# Patient Record
Sex: FEMALE | Race: BLACK OR AFRICAN AMERICAN | NOT HISPANIC OR LATINO | Employment: OTHER | URBAN - METROPOLITAN AREA
[De-identification: names, ages, dates, MRNs, and addresses within clinical notes are randomized per-mention and may not be internally consistent; named-entity substitution may affect disease eponyms.]

---

## 2023-09-25 PROBLEM — Z20.822 CLOSE EXPOSURE TO SEVERE ACUTE RESPIRATORY SYNDROME CORONAVIRUS 2 (SARS-COV-2): Status: ACTIVE | Noted: 2022-01-24

## 2023-09-25 PROBLEM — L29.9 PRURITUS: Status: ACTIVE | Noted: 2023-09-25

## 2023-09-25 PROBLEM — G95.20 CORD COMPRESSION (MULTI): Status: ACTIVE | Noted: 2023-09-25

## 2023-09-25 PROBLEM — R00.0 TACHYCARDIA: Status: ACTIVE | Noted: 2023-09-25

## 2023-09-25 PROBLEM — R79.89 OTHER SPECIFIED ABNORMAL FINDINGS OF BLOOD CHEMISTRY: Status: ACTIVE | Noted: 2022-01-24

## 2023-09-25 PROBLEM — J43.9 MILD EMPHYSEMA (MULTI): Status: ACTIVE | Noted: 2022-01-24

## 2023-09-25 PROBLEM — E46 PROTEIN-CALORIE MALNUTRITION (MULTI): Status: ACTIVE | Noted: 2023-09-25

## 2023-09-25 PROBLEM — R63.4 WEIGHT LOSS: Status: ACTIVE | Noted: 2023-09-25

## 2023-09-25 PROBLEM — C79.51 SECONDARY MALIGNANT NEOPLASM OF BONE AND BONE MARROW (MULTI): Status: ACTIVE | Noted: 2023-09-25

## 2023-09-25 PROBLEM — R07.9 CHEST PAIN: Status: ACTIVE | Noted: 2022-01-24

## 2023-09-25 PROBLEM — R06.02 SHORTNESS OF BREATH: Status: ACTIVE | Noted: 2023-09-25

## 2023-09-25 PROBLEM — M54.50 ACUTE EXACERBATION OF CHRONIC LOW BACK PAIN: Status: ACTIVE | Noted: 2023-09-25

## 2023-09-25 PROBLEM — G47.00 INSOMNIA: Status: ACTIVE | Noted: 2023-09-25

## 2023-09-25 PROBLEM — G89.29 ACUTE EXACERBATION OF CHRONIC LOW BACK PAIN: Status: ACTIVE | Noted: 2023-09-25

## 2023-09-25 PROBLEM — C44.92 SCC (SQUAMOUS CELL CARCINOMA): Status: ACTIVE | Noted: 2023-09-25

## 2023-09-25 PROBLEM — M81.0 OSTEOPOROSIS: Status: ACTIVE | Noted: 2023-09-25

## 2023-09-25 PROBLEM — C79.52 SECONDARY MALIGNANT NEOPLASM OF BONE AND BONE MARROW (MULTI): Status: ACTIVE | Noted: 2023-09-25

## 2023-09-25 PROBLEM — G93.0 ARACHNOID CYST: Status: ACTIVE | Noted: 2023-09-25

## 2023-09-25 PROBLEM — C50.919 PRIMARY MALIGNANT NEOPLASM OF BREAST WITH METASTASIS (MULTI): Status: ACTIVE | Noted: 2023-09-25

## 2023-09-25 PROBLEM — S92.901A FRACTURE OF RIGHT FOOT: Status: ACTIVE | Noted: 2023-09-25

## 2023-09-25 RX ORDER — TRAZODONE HYDROCHLORIDE 100 MG/1
TABLET ORAL
COMMUNITY
Start: 2016-04-30

## 2023-09-25 RX ORDER — HYDROCHLOROTHIAZIDE 25 MG/1
TABLET ORAL
COMMUNITY
Start: 2016-04-19 | End: 2023-10-08 | Stop reason: ALTCHOICE

## 2023-09-25 RX ORDER — POTASSIUM CHLORIDE 750 MG/1
TABLET, FILM COATED, EXTENDED RELEASE ORAL
COMMUNITY
Start: 2016-06-10

## 2023-09-25 RX ORDER — TIZANIDINE 2 MG/1
TABLET ORAL
COMMUNITY
Start: 2016-10-04

## 2023-09-25 RX ORDER — CARVEDILOL 12.5 MG/1
TABLET ORAL
COMMUNITY
Start: 2016-05-06

## 2023-09-25 RX ORDER — ONDANSETRON HYDROCHLORIDE 8 MG/1
8 TABLET, FILM COATED ORAL EVERY 8 HOURS
COMMUNITY
Start: 2022-06-15

## 2023-09-25 RX ORDER — GABAPENTIN 300 MG/1
CAPSULE ORAL
COMMUNITY
Start: 2016-06-04

## 2023-09-25 RX ORDER — NAPROXEN 500 MG/1
TABLET ORAL
COMMUNITY
Start: 2016-05-07

## 2023-09-25 RX ORDER — PRAVASTATIN SODIUM 20 MG/1
TABLET ORAL
COMMUNITY
Start: 2016-04-29

## 2023-09-25 RX ORDER — TRAMADOL HYDROCHLORIDE 50 MG/1
TABLET ORAL
COMMUNITY
Start: 2016-08-09

## 2023-09-25 RX ORDER — ACETAZOLAMIDE 250 MG/1
1 TABLET ORAL 2 TIMES DAILY
COMMUNITY
Start: 2017-09-27 | End: 2023-10-08 | Stop reason: ALTCHOICE

## 2023-09-25 RX ORDER — ALENDRONATE SODIUM 70 MG/1
TABLET ORAL
COMMUNITY
Start: 2016-04-18 | End: 2023-10-08 | Stop reason: ALTCHOICE

## 2023-10-02 ENCOUNTER — OFFICE VISIT (OUTPATIENT)
Dept: HEMATOLOGY/ONCOLOGY | Facility: CLINIC | Age: 78
End: 2023-10-02
Payer: COMMERCIAL

## 2023-10-02 VITALS
TEMPERATURE: 98.2 F | SYSTOLIC BLOOD PRESSURE: 113 MMHG | WEIGHT: 112.88 LBS | DIASTOLIC BLOOD PRESSURE: 74 MMHG | HEART RATE: 104 BPM | BODY MASS INDEX: 19.95 KG/M2

## 2023-10-02 DIAGNOSIS — M89.9 BONE DISORDER: ICD-10-CM

## 2023-10-02 DIAGNOSIS — Z79.899 ENCOUNTER FOR MONITORING DENOSUMAB THERAPY: ICD-10-CM

## 2023-10-02 DIAGNOSIS — Z51.81 ENCOUNTER FOR MONITORING DENOSUMAB THERAPY: ICD-10-CM

## 2023-10-02 DIAGNOSIS — C50.919 PRIMARY MALIGNANT NEOPLASM OF BREAST WITH METASTASIS (MULTI): Primary | ICD-10-CM

## 2023-10-02 DIAGNOSIS — C79.51 SECONDARY MALIGNANT NEOPLASM OF BONE AND BONE MARROW (MULTI): ICD-10-CM

## 2023-10-02 DIAGNOSIS — C79.52 SECONDARY MALIGNANT NEOPLASM OF BONE AND BONE MARROW (MULTI): ICD-10-CM

## 2023-10-02 PROCEDURE — 1126F AMNT PAIN NOTED NONE PRSNT: CPT | Performed by: INTERNAL MEDICINE

## 2023-10-02 PROCEDURE — 99215 OFFICE O/P EST HI 40 MIN: CPT | Performed by: INTERNAL MEDICINE

## 2023-10-02 RX ORDER — PROCHLORPERAZINE EDISYLATE 5 MG/ML
10 INJECTION INTRAMUSCULAR; INTRAVENOUS EVERY 6 HOURS PRN
OUTPATIENT
Start: 2024-01-25

## 2023-10-02 RX ORDER — PROCHLORPERAZINE EDISYLATE 5 MG/ML
10 INJECTION INTRAMUSCULAR; INTRAVENOUS EVERY 6 HOURS PRN
OUTPATIENT
Start: 2023-12-28

## 2023-10-02 RX ORDER — LAMOTRIGINE 25 MG/1
500 TABLET ORAL ONCE
Status: CANCELLED | OUTPATIENT
Start: 2023-10-30

## 2023-10-02 RX ORDER — EPINEPHRINE 0.3 MG/.3ML
0.3 INJECTION SUBCUTANEOUS EVERY 5 MIN PRN
Status: CANCELLED | OUTPATIENT
Start: 2023-10-30

## 2023-10-02 RX ORDER — PROCHLORPERAZINE MALEATE 5 MG
10 TABLET ORAL EVERY 6 HOURS PRN
OUTPATIENT
Start: 2024-01-25

## 2023-10-02 RX ORDER — FAMOTIDINE 10 MG/ML
20 INJECTION INTRAVENOUS ONCE AS NEEDED
Status: CANCELLED | OUTPATIENT
Start: 2023-10-30

## 2023-10-02 RX ORDER — DIPHENHYDRAMINE HYDROCHLORIDE 50 MG/ML
50 INJECTION INTRAMUSCULAR; INTRAVENOUS AS NEEDED
Status: CANCELLED | OUTPATIENT
Start: 2023-10-30

## 2023-10-02 RX ORDER — METHYLPREDNISOLONE SODIUM SUCCINATE 40 MG/ML
40 INJECTION INTRAMUSCULAR; INTRAVENOUS AS NEEDED
OUTPATIENT
Start: 2023-12-14

## 2023-10-02 RX ORDER — PROCHLORPERAZINE MALEATE 5 MG
10 TABLET ORAL EVERY 6 HOURS PRN
OUTPATIENT
Start: 2023-12-28

## 2023-10-02 RX ORDER — PROCHLORPERAZINE MALEATE 5 MG
10 TABLET ORAL EVERY 6 HOURS PRN
OUTPATIENT
Start: 2023-12-14

## 2023-10-02 RX ORDER — FAMOTIDINE 10 MG/ML
20 INJECTION INTRAVENOUS ONCE AS NEEDED
OUTPATIENT
Start: 2023-12-28

## 2023-10-02 RX ORDER — DIPHENHYDRAMINE HYDROCHLORIDE 50 MG/ML
50 INJECTION INTRAMUSCULAR; INTRAVENOUS AS NEEDED
OUTPATIENT
Start: 2023-12-14

## 2023-10-02 RX ORDER — PROCHLORPERAZINE EDISYLATE 5 MG/ML
10 INJECTION INTRAMUSCULAR; INTRAVENOUS EVERY 6 HOURS PRN
Status: CANCELLED | OUTPATIENT
Start: 2023-10-30

## 2023-10-02 RX ORDER — DIPHENHYDRAMINE HYDROCHLORIDE 50 MG/ML
50 INJECTION INTRAMUSCULAR; INTRAVENOUS AS NEEDED
OUTPATIENT
Start: 2024-01-25

## 2023-10-02 RX ORDER — LAMOTRIGINE 25 MG/1
500 TABLET ORAL ONCE
OUTPATIENT
Start: 2024-01-25

## 2023-10-02 RX ORDER — FAMOTIDINE 10 MG/ML
20 INJECTION INTRAVENOUS ONCE AS NEEDED
OUTPATIENT
Start: 2023-12-14

## 2023-10-02 RX ORDER — METHYLPREDNISOLONE SODIUM SUCCINATE 40 MG/ML
40 INJECTION INTRAMUSCULAR; INTRAVENOUS AS NEEDED
Status: CANCELLED | OUTPATIENT
Start: 2023-10-30

## 2023-10-02 RX ORDER — EPINEPHRINE 0.3 MG/.3ML
0.3 INJECTION SUBCUTANEOUS EVERY 5 MIN PRN
OUTPATIENT
Start: 2023-12-14

## 2023-10-02 RX ORDER — EPINEPHRINE 0.3 MG/.3ML
0.3 INJECTION SUBCUTANEOUS EVERY 5 MIN PRN
OUTPATIENT
Start: 2023-12-13

## 2023-10-02 RX ORDER — PROCHLORPERAZINE MALEATE 5 MG
10 TABLET ORAL EVERY 6 HOURS PRN
Status: CANCELLED | OUTPATIENT
Start: 2023-10-30

## 2023-10-02 RX ORDER — METHYLPREDNISOLONE SODIUM SUCCINATE 40 MG/ML
40 INJECTION INTRAMUSCULAR; INTRAVENOUS AS NEEDED
OUTPATIENT
Start: 2023-12-13

## 2023-10-02 RX ORDER — METHYLPREDNISOLONE SODIUM SUCCINATE 40 MG/ML
40 INJECTION INTRAMUSCULAR; INTRAVENOUS AS NEEDED
OUTPATIENT
Start: 2024-01-25

## 2023-10-02 RX ORDER — EPINEPHRINE 0.3 MG/.3ML
0.3 INJECTION SUBCUTANEOUS EVERY 5 MIN PRN
OUTPATIENT
Start: 2023-12-28

## 2023-10-02 RX ORDER — DIPHENHYDRAMINE HYDROCHLORIDE 50 MG/ML
50 INJECTION INTRAMUSCULAR; INTRAVENOUS AS NEEDED
OUTPATIENT
Start: 2023-12-13

## 2023-10-02 RX ORDER — FAMOTIDINE 10 MG/ML
20 INJECTION INTRAVENOUS ONCE AS NEEDED
OUTPATIENT
Start: 2024-01-25

## 2023-10-02 RX ORDER — ALBUTEROL SULFATE 0.83 MG/ML
3 SOLUTION RESPIRATORY (INHALATION) AS NEEDED
OUTPATIENT
Start: 2023-12-13

## 2023-10-02 RX ORDER — LAMOTRIGINE 25 MG/1
500 TABLET ORAL ONCE
OUTPATIENT
Start: 2023-12-28

## 2023-10-02 RX ORDER — LAMOTRIGINE 25 MG/1
500 TABLET ORAL ONCE
OUTPATIENT
Start: 2023-12-14

## 2023-10-02 RX ORDER — EPINEPHRINE 0.3 MG/.3ML
0.3 INJECTION SUBCUTANEOUS EVERY 5 MIN PRN
OUTPATIENT
Start: 2024-01-25

## 2023-10-02 RX ORDER — DIPHENHYDRAMINE HYDROCHLORIDE 50 MG/ML
50 INJECTION INTRAMUSCULAR; INTRAVENOUS AS NEEDED
OUTPATIENT
Start: 2023-12-28

## 2023-10-02 RX ORDER — METHYLPREDNISOLONE SODIUM SUCCINATE 40 MG/ML
40 INJECTION INTRAMUSCULAR; INTRAVENOUS AS NEEDED
OUTPATIENT
Start: 2023-12-28

## 2023-10-02 RX ORDER — ALBUTEROL SULFATE 0.83 MG/ML
3 SOLUTION RESPIRATORY (INHALATION) AS NEEDED
OUTPATIENT
Start: 2023-12-28

## 2023-10-02 RX ORDER — FAMOTIDINE 10 MG/ML
20 INJECTION INTRAVENOUS ONCE AS NEEDED
OUTPATIENT
Start: 2023-12-13

## 2023-10-02 RX ORDER — ALBUTEROL SULFATE 0.83 MG/ML
3 SOLUTION RESPIRATORY (INHALATION) AS NEEDED
OUTPATIENT
Start: 2024-01-25

## 2023-10-02 RX ORDER — ALBUTEROL SULFATE 0.83 MG/ML
3 SOLUTION RESPIRATORY (INHALATION) AS NEEDED
OUTPATIENT
Start: 2023-12-14

## 2023-10-02 RX ORDER — PROCHLORPERAZINE EDISYLATE 5 MG/ML
10 INJECTION INTRAMUSCULAR; INTRAVENOUS EVERY 6 HOURS PRN
OUTPATIENT
Start: 2023-12-14

## 2023-10-02 RX ORDER — ALBUTEROL SULFATE 0.83 MG/ML
3 SOLUTION RESPIRATORY (INHALATION) AS NEEDED
Status: CANCELLED | OUTPATIENT
Start: 2023-10-30

## 2023-10-02 ASSESSMENT — PAIN SCALES - GENERAL: PAINLEVEL: 0-NO PAIN

## 2023-10-08 PROBLEM — Z51.81 ENCOUNTER FOR MONITORING DENOSUMAB THERAPY: Status: ACTIVE | Noted: 2023-10-08

## 2023-10-08 PROBLEM — Z79.899 ENCOUNTER FOR MONITORING DENOSUMAB THERAPY: Status: ACTIVE | Noted: 2023-10-08

## 2023-10-09 NOTE — PROGRESS NOTES
Patient ID: Fern Jenkins is a 78 y.o. female.  The patient presents to clinic today for her history of metastatic breast cancer.    Diagnostic/Therapeutic History:  Recurrent metastatic breast cancer (ER/AK positive, Her2-negative):   1. S/p left breast lumpectomy in 2006 at [facility].   2. S/p adjuvant chemotherapy and radiation therapy.  3. Adjuvant hormonal therapy with tamoxifen from 2006 to 2011.   4. Followed with close observation from 2011 to 2017.   5. Disease recurrence in 2017 with bone only metastases. S/p decompression (T9-T11 laminectomies, adjuvant XRT).  6. Anastrozole 2/2017 to 4/2020 (stopped for progressive disease).  7. Fulvestrant/abemaciclib, initiated 4/2020.  Bone metastases: Denosumab 120 mg q4 weeks, initiated 2/2017.  8. 10/2021 Denosumab held for dental issues, referral to oral surgery   9. Lost to follow-up August 2022, numerous attempted to reach patient. Daughter called reporting patient went on hospice care. Patient reconnected with  1/2023 to re-establish care. Restaging scans stable. Resumed prior treatment.    History of Present Illness (HPI)/Interval History:  Fern presents today for extended FUV.  She was last seen  in 2/2023.  She states she feels quite well overall.  She lost a lot of weight earlier this year, but has since returned to near her baseline.  No new bone pain, dyspnea, nausea, abdominal pain.  She has been taking Verzenio daily, but has not had recent labs.    Review of Systems:  14-point ROS otherwise negative, as per HPI/Interval History.    No past medical history on file.  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    Social History     Socioeconomic History    Marital status:      Spouse name: Not on file    Number of children: Not on file    Years of education: Not on file    Highest education level: Not on file   Occupational History    Not on file   Tobacco Use    Smoking status: Never    Smokeless tobacco: Never   Substance and Sexual Activity    Alcohol use: Never    Drug use: Not on file    Sexual activity: Not on file   Other Topics Concern    Not on file   Social History Narrative    Not on file     Social Determinants of Health     Financial Resource Strain: Not on file   Food Insecurity: Not on file   Transportation Needs: Not on file   Physical Activity: Not on file   Stress: Not on file   Social Connections: Not on file   Intimate Partner Violence: Not on file   Housing Stability: Not on file       Allergies:   Allergies   Allergen Reactions    Amoxicillin Unknown    Tramadol Other     psychosis         Current Outpatient Medications:     ASPIRIN-ACETAMINOPHEN-CAFFEINE ORAL, Excedrin Extra Strength CAPS  Refills: 0     Active, Disp: , Rfl:     abemaciclib (Verzenio) 150 mg tablet, TAKE ONE (1) TABLET BY MOUTH ONCE A DAY, Disp: 28 tablet, Rfl: 5    acetaZOLAMIDE (Diamox) 250 mg tablet, Take 1 tablet (250 mg) by mouth 2 times a day., Disp: , Rfl:     alendronate (Fosamax) 70 mg tablet, Alendronate Sodium 70 MG Oral Tablet  Quantity: 4  Refills: 0      Start : 18-Apr-2016  Active, Disp: , Rfl:     carvedilol (Coreg) 12.5 mg tablet, Carvedilol 12.5 MG Oral Tablet  Quantity: 60  Refills: 0      Start : 6-May-2016  Active, Disp: , Rfl:     gabapentin (Neurontin) 300 mg capsule, Gabapentin 300 MG Oral Capsule   Quantity: 90  Refills: 0      Start : 4-Jun-2016  Active, Disp: , Rfl:     hydroCHLOROthiazide (HYDRODiuril) 25 mg tablet, hydroCHLOROthiazide 25 MG Oral Tablet  Quantity: 30  Refills: 0      Start : 19-Apr-2016  Active, Disp: , Rfl:     naproxen (Naprosyn) 500 mg tablet, Naproxen 500 MG Oral Tablet  Quantity: 20  Refills: 0      Start : 7-May-2016  Active, Disp: , Rfl:     ondansetron (Zofran) 8 mg tablet, Take 1 tablet (8 mg) by mouth every 8 hours., Disp: , Rfl:     potassium chloride CR (Klor-Con M10) 10 mEq ER tablet, Klor-Con M10 10 MEQ Oral Tablet Extended Release  Quantity: 60  Refills: 0      Start : 10-Ignacio-2016  Active, Disp: , Rfl:     pravastatin (Pravachol) 20 mg tablet, Pravastatin Sodium 20 MG Oral Tablet  Quantity: 30  Refills: 0      Start : 29-Apr-2016  Active, Disp: , Rfl:     tiZANidine (Zanaflex) 2 mg tablet, Take 1-2 tablets po qhs prn muscle spasm, Disp: , Rfl:     traMADol (Ultram) 50 mg tablet, traMADol HCl - 50 MG Oral Tablet  Quantity: 20  Refills: 0      Start : 9-Aug-2016  Active, Disp: , Rfl:     traZODone (Desyrel) 100 mg tablet, traZODone HCl - 100 MG Oral Tablet  Quantity: 30  Refills: 0      Start : 30-Apr-2016  Active, Disp: , Rfl:      Objective    BSA: 1.51 meters squared  /74 (BP Location: Right arm, Patient Position: Sitting, BP Cuff Size: Adult)   Pulse 104   Temp 36.8 °C (98.2 °F) (Skin)   Wt 51.2 kg (112 lb 14 oz)   BMI 19.95 kg/m²     ECOG Performance Status:  0 Fully active, able to carry on all pre-disease performance without restriction.  1 Restricted in physically strenuous activity but ambulatory and able to carry out work of a light or sedentary nature, e.g., light house work, office work.  2 Ambulatory and capable of all selfcare but unable to carry out any work activities; up and about more than 50% of waking hours.  3 Capable of only limited selfcare; confined to bed or chair more than 50% of waking hours.  4 Completely disabled; cannot carry on any  selfcare; totally confined to bed or chair.    Physical Exam  Constitutional:       General: She is not in acute distress.     Comments: Thin   HENT:      Head: Normocephalic and atraumatic.      Mouth/Throat:      Mouth: Mucous membranes are moist.      Pharynx: No oropharyngeal exudate.   Eyes:      General: No scleral icterus.  Cardiovascular:      Rate and Rhythm: Normal rate and regular rhythm.      Heart sounds: No murmur heard.  Pulmonary:      Effort: Pulmonary effort is normal. No respiratory distress.      Breath sounds: Normal breath sounds.   Musculoskeletal:         General: No swelling or tenderness.      Cervical back: Normal range of motion and neck supple. No rigidity or tenderness.   Lymphadenopathy:      Cervical: No cervical adenopathy.   Skin:     General: Skin is warm and dry.      Coloration: Skin is not jaundiced.      Findings: No rash.   Neurological:      Mental Status: She is alert.         Laboratory Data:  Lab Results   Component Value Date    WBC 4.3 (L) 07/13/2022    HGB 9.7 (L) 07/13/2022    HCT 29.6 (L) 07/13/2022     (H) 07/13/2022     07/13/2022    ANC 1.18 (L) 05/26/2020       Chemistry    Lab Results   Component Value Date/Time     07/13/2022 1356    K 3.6 07/13/2022 1356     07/13/2022 1356    CO2 27 07/13/2022 1356    BUN 13 07/13/2022 1356    CREATININE 1.25 (H) 07/13/2022 1356    Lab Results   Component Value Date/Time    CALCIUM 10.6 (H) 07/13/2022 1356    ALKPHOS 38 07/13/2022 1356    AST 15 07/13/2022 1356    ALT 4 (L) 07/13/2022 1356    BILITOT 0.3 07/13/2022 1356             Radiology:  No recent radiology to review.    Pathology:  No recent pathology to review.         Assessment/Plan:    Fern Jenkins is a 78 y.o. female with a history of metastatic ER+/Her2- breast cancer, who presents today follow-up evaluation.    Metastatic Breast Cancer.  With osseous metastases. She was initially treated with curative intent therapy (lumpectomy,   chemotherapy, RT) in 2006. Recurrent disease noted in 2017 s/p T9-T11 laminectomies and resection of epidural tumor, followed by XRT. Her disease progressed on single-agent anastrozole, and she has been on fulvestrant with abemaciclib since 4/2020. Lost to follow-up 8/2022-2/2023.    She was lost to follow-up since 2/2023 (seen by Dr. Salinas), although was reportedly taking abemaciclib daily without laboratory monitoring or concurrent fulvestrant.  - Obtain updated labs (CBC, CMP, CA27-29).  - Obtain updated CT CAP and bone scan.  - She can continue abemaciclib 150 mg daily (her long-term dose), pending results of laboratory studies.  - Restart fulvestrant (with loading dose).    Osseous metastases.  - Restart Xgeva; plan for 120 mg q12 weeks.  - She has had teeth removed; no active dental issues at this time.  - Check vitamin D level.    Disposition.  - Baseline labs.  - RTC ~8 weeks.  - She has our contact information and was instructed to call with concerns/questions in the interim.    Orders Placed This Encounter   Procedures    CT chest w and wo IV contrast    NM bone whole body 3 phase    CBC and Auto Differential    Comprehensive Metabolic Panel    Cancer Antigen 27-29    Vitamin D 25-Hydroxy,Total (for eval of Vitamin D levels)        Matt Rose MD  Hematology and Medical Oncology  [facility]

## 2023-10-16 ENCOUNTER — APPOINTMENT (OUTPATIENT)
Dept: HEMATOLOGY/ONCOLOGY | Facility: CLINIC | Age: 78
End: 2023-10-16
Payer: COMMERCIAL

## 2023-10-27 DIAGNOSIS — C50.919 PRIMARY MALIGNANT NEOPLASM OF BREAST WITH METASTASIS (MULTI): Primary | ICD-10-CM

## 2023-10-30 ENCOUNTER — APPOINTMENT (OUTPATIENT)
Dept: RADIOLOGY | Facility: CLINIC | Age: 78
End: 2023-10-30
Payer: COMMERCIAL

## 2023-10-30 ENCOUNTER — ANCILLARY PROCEDURE (OUTPATIENT)
Dept: RADIOLOGY | Facility: CLINIC | Age: 78
End: 2023-10-30
Payer: COMMERCIAL

## 2023-10-30 ENCOUNTER — INFUSION (OUTPATIENT)
Dept: HEMATOLOGY/ONCOLOGY | Facility: CLINIC | Age: 78
End: 2023-10-30
Payer: COMMERCIAL

## 2023-10-30 ENCOUNTER — APPOINTMENT (OUTPATIENT)
Dept: HEMATOLOGY/ONCOLOGY | Facility: CLINIC | Age: 78
End: 2023-10-30
Payer: COMMERCIAL

## 2023-10-30 VITALS
SYSTOLIC BLOOD PRESSURE: 129 MMHG | DIASTOLIC BLOOD PRESSURE: 94 MMHG | WEIGHT: 115.96 LBS | TEMPERATURE: 97.9 F | RESPIRATION RATE: 16 BRPM | HEART RATE: 99 BPM | BODY MASS INDEX: 20.5 KG/M2

## 2023-10-30 DIAGNOSIS — Z51.81 ENCOUNTER FOR MONITORING DENOSUMAB THERAPY: ICD-10-CM

## 2023-10-30 DIAGNOSIS — C50.919 PRIMARY MALIGNANT NEOPLASM OF BREAST WITH METASTASIS (MULTI): Primary | ICD-10-CM

## 2023-10-30 DIAGNOSIS — C50.919 PRIMARY MALIGNANT NEOPLASM OF BREAST WITH METASTASIS (MULTI): ICD-10-CM

## 2023-10-30 DIAGNOSIS — M89.9 BONE DISORDER: ICD-10-CM

## 2023-10-30 DIAGNOSIS — Z79.899 ENCOUNTER FOR MONITORING DENOSUMAB THERAPY: ICD-10-CM

## 2023-10-30 DIAGNOSIS — C79.52 SECONDARY MALIGNANT NEOPLASM OF BONE AND BONE MARROW (MULTI): ICD-10-CM

## 2023-10-30 DIAGNOSIS — C79.51 SECONDARY MALIGNANT NEOPLASM OF BONE AND BONE MARROW (MULTI): ICD-10-CM

## 2023-10-30 LAB
25(OH)D3 SERPL-MCNC: 19 NG/ML (ref 30–100)
ALBUMIN SERPL BCP-MCNC: 4.2 G/DL (ref 3.4–5)
ALP SERPL-CCNC: 77 U/L (ref 33–136)
ALT SERPL W P-5'-P-CCNC: 4 U/L (ref 7–45)
ANION GAP SERPL CALC-SCNC: 16 MMOL/L (ref 10–20)
ANION GAP SERPL CALC-SCNC: 16 MMOL/L (ref 10–20)
AST SERPL W P-5'-P-CCNC: 20 U/L (ref 9–39)
BASOPHILS # BLD AUTO: 0.04 X10*3/UL (ref 0–0.1)
BASOPHILS NFR BLD AUTO: 1.1 %
BILIRUB SERPL-MCNC: 0.4 MG/DL (ref 0–1.2)
BUN SERPL-MCNC: 14 MG/DL (ref 6–23)
BUN SERPL-MCNC: 16 MG/DL (ref 6–23)
CA-I BLD-SCNC: 1.3 MMOL/L (ref 1.1–1.33)
CALCIUM SERPL-MCNC: 10.3 MG/DL (ref 8.6–10.6)
CHLORIDE SERPL-SCNC: 103 MMOL/L (ref 98–107)
CHLORIDE SERPL-SCNC: 104 MMOL/L (ref 98–107)
CO2 SERPL-SCNC: 27 MMOL/L (ref 21–32)
CO2 SERPL-SCNC: 28 MMOL/L (ref 21–32)
CREAT SERPL-MCNC: 1.29 MG/DL (ref 0.5–1.05)
CREAT SERPL-MCNC: 1.4 MG/DL (ref 0.6–1.3)
EOSINOPHIL # BLD AUTO: 0.1 X10*3/UL (ref 0–0.4)
EOSINOPHIL NFR BLD AUTO: 2.7 %
ERYTHROCYTE [DISTWIDTH] IN BLOOD BY AUTOMATED COUNT: 13.8 % (ref 11.5–14.5)
GFR SERPL CREATININE-BSD FRML MDRD: 39 ML/MIN/1.73M*2
GFR SERPL CREATININE-BSD FRML MDRD: 43 ML/MIN/1.73M*2
GLUCOSE SERPL-MCNC: 86 MG/DL (ref 74–99)
GLUCOSE SERPL-MCNC: 94 MG/DL (ref 74–99)
HCT VFR BLD AUTO: 28.9 % (ref 36–46)
HGB BLD-MCNC: 9 G/DL (ref 12–16)
IMM GRANULOCYTES # BLD AUTO: 0.01 X10*3/UL (ref 0–0.5)
IMM GRANULOCYTES NFR BLD AUTO: 0.3 % (ref 0–0.9)
LYMPHOCYTES # BLD AUTO: 1.28 X10*3/UL (ref 0.8–3)
LYMPHOCYTES NFR BLD AUTO: 35.2 %
MAGNESIUM SERPL-MCNC: 1.59 MG/DL (ref 1.6–2.4)
MCH RBC QN AUTO: 34.9 PG (ref 26–34)
MCHC RBC AUTO-ENTMCNC: 31.1 G/DL (ref 32–36)
MCV RBC AUTO: 112 FL (ref 80–100)
MONOCYTES # BLD AUTO: 0.21 X10*3/UL (ref 0.05–0.8)
MONOCYTES NFR BLD AUTO: 5.8 %
NEUTROPHILS # BLD AUTO: 2 X10*3/UL (ref 1.6–5.5)
NEUTROPHILS NFR BLD AUTO: 54.9 %
NRBC BLD-RTO: ABNORMAL /100{WBCS}
PHOSPHATE SERPL-MCNC: 3.3 MG/DL (ref 2.5–4.9)
PLATELET # BLD AUTO: 236 X10*3/UL (ref 150–450)
PMV BLD AUTO: 8.2 FL (ref 7.5–11.5)
POTASSIUM SERPL-SCNC: 3.7 MMOL/L (ref 3.5–5.3)
POTASSIUM SERPL-SCNC: 4 MMOL/L (ref 3.5–5.3)
PROT SERPL-MCNC: 8 G/DL (ref 6.4–8.2)
RBC # BLD AUTO: 2.58 X10*6/UL (ref 4–5.2)
SODIUM SERPL-SCNC: 142 MMOL/L (ref 136–145)
SODIUM SERPL-SCNC: 144 MMOL/L (ref 136–145)
WBC # BLD AUTO: 3.6 X10*3/UL (ref 4.4–11.3)

## 2023-10-30 PROCEDURE — 80053 COMPREHEN METABOLIC PANEL: CPT

## 2023-10-30 PROCEDURE — 83735 ASSAY OF MAGNESIUM: CPT

## 2023-10-30 PROCEDURE — 78306 BONE IMAGING WHOLE BODY: CPT | Performed by: RADIOLOGY

## 2023-10-30 PROCEDURE — 96372 THER/PROPH/DIAG INJ SC/IM: CPT

## 2023-10-30 PROCEDURE — 96372 THER/PROPH/DIAG INJ SC/IM: CPT | Mod: JG | Performed by: INTERNAL MEDICINE

## 2023-10-30 PROCEDURE — 2500000004 HC RX 250 GENERAL PHARMACY W/ HCPCS (ALT 636 FOR OP/ED): Mod: JZ,JG | Performed by: INTERNAL MEDICINE

## 2023-10-30 PROCEDURE — 85025 COMPLETE CBC W/AUTO DIFF WBC: CPT

## 2023-10-30 PROCEDURE — 84100 ASSAY OF PHOSPHORUS: CPT

## 2023-10-30 PROCEDURE — 3430000001 HC RX 343 DIAGNOSTIC RADIOPHARMACEUTICALS: Performed by: INTERNAL MEDICINE

## 2023-10-30 PROCEDURE — 36415 COLL VENOUS BLD VENIPUNCTURE: CPT | Performed by: INTERNAL MEDICINE

## 2023-10-30 PROCEDURE — 96402 CHEMO HORMON ANTINEOPL SQ/IM: CPT

## 2023-10-30 PROCEDURE — 80047 BASIC METABLC PNL IONIZED CA: CPT | Mod: 59 | Performed by: INTERNAL MEDICINE

## 2023-10-30 PROCEDURE — 86300 IMMUNOASSAY TUMOR CA 15-3: CPT

## 2023-10-30 PROCEDURE — 82306 VITAMIN D 25 HYDROXY: CPT

## 2023-10-30 PROCEDURE — 71260 CT THORAX DX C+: CPT | Mod: MG

## 2023-10-30 PROCEDURE — A9503 TC99M MEDRONATE: HCPCS | Performed by: INTERNAL MEDICINE

## 2023-10-30 PROCEDURE — 71260 CT THORAX DX C+: CPT | Performed by: RADIOLOGY

## 2023-10-30 PROCEDURE — 36415 COLL VENOUS BLD VENIPUNCTURE: CPT

## 2023-10-30 PROCEDURE — 2550000001 HC RX 255 CONTRASTS: Performed by: INTERNAL MEDICINE

## 2023-10-30 PROCEDURE — 78306 BONE IMAGING WHOLE BODY: CPT | Mod: MG

## 2023-10-30 RX ORDER — PROCHLORPERAZINE MALEATE 10 MG
10 TABLET ORAL EVERY 6 HOURS PRN
Status: DISCONTINUED | OUTPATIENT
Start: 2023-10-30 | End: 2023-10-30 | Stop reason: HOSPADM

## 2023-10-30 RX ORDER — DIPHENHYDRAMINE HYDROCHLORIDE 50 MG/ML
50 INJECTION INTRAMUSCULAR; INTRAVENOUS AS NEEDED
Status: DISCONTINUED | OUTPATIENT
Start: 2023-10-30 | End: 2023-10-30 | Stop reason: HOSPADM

## 2023-10-30 RX ORDER — FAMOTIDINE 10 MG/ML
20 INJECTION INTRAVENOUS ONCE AS NEEDED
Status: DISCONTINUED | OUTPATIENT
Start: 2023-10-30 | End: 2023-10-30 | Stop reason: HOSPADM

## 2023-10-30 RX ORDER — EPINEPHRINE 0.3 MG/.3ML
0.3 INJECTION SUBCUTANEOUS EVERY 5 MIN PRN
Status: DISCONTINUED | OUTPATIENT
Start: 2023-10-30 | End: 2023-10-30 | Stop reason: HOSPADM

## 2023-10-30 RX ORDER — ALBUTEROL SULFATE 0.83 MG/ML
3 SOLUTION RESPIRATORY (INHALATION) AS NEEDED
Status: DISCONTINUED | OUTPATIENT
Start: 2023-10-30 | End: 2023-10-30 | Stop reason: HOSPADM

## 2023-10-30 RX ORDER — LAMOTRIGINE 25 MG/1
500 TABLET ORAL ONCE
Status: COMPLETED | OUTPATIENT
Start: 2023-10-30 | End: 2023-10-30

## 2023-10-30 RX ORDER — PROCHLORPERAZINE EDISYLATE 5 MG/ML
10 INJECTION INTRAMUSCULAR; INTRAVENOUS EVERY 6 HOURS PRN
Status: DISCONTINUED | OUTPATIENT
Start: 2023-10-30 | End: 2023-10-30 | Stop reason: HOSPADM

## 2023-10-30 RX ADMIN — TECHNETIUM TC 99M MEDRONATE 25 MILLICURIE: 25 INJECTION, POWDER, FOR SOLUTION INTRAVENOUS at 11:00

## 2023-10-30 RX ADMIN — IOHEXOL 50 ML: 350 INJECTION, SOLUTION INTRAVENOUS at 11:22

## 2023-10-30 RX ADMIN — FULVESTRANT 500 MG: 50 INJECTION INTRAMUSCULAR at 10:38

## 2023-10-30 ASSESSMENT — PAIN SCALES - GENERAL: PAINLEVEL: 0-NO PAIN

## 2023-10-31 LAB — CANCER AG27-29 SERPL-ACNC: 445.7 U/ML (ref 0–38.6)

## 2023-11-13 ENCOUNTER — APPOINTMENT (OUTPATIENT)
Dept: HEMATOLOGY/ONCOLOGY | Facility: CLINIC | Age: 78
End: 2023-11-13
Payer: COMMERCIAL

## 2023-11-16 ENCOUNTER — SPECIALTY PHARMACY (OUTPATIENT)
Dept: PHARMACY | Facility: CLINIC | Age: 78
End: 2023-11-16

## 2023-12-01 ENCOUNTER — PHARMACY VISIT (OUTPATIENT)
Dept: PHARMACY | Facility: CLINIC | Age: 78
End: 2023-12-01
Payer: COMMERCIAL

## 2023-12-01 PROCEDURE — RXMED WILLOW AMBULATORY MEDICATION CHARGE

## 2023-12-11 ENCOUNTER — TELEPHONE (OUTPATIENT)
Dept: HEMATOLOGY/ONCOLOGY | Facility: CLINIC | Age: 78
End: 2023-12-11
Payer: COMMERCIAL

## 2023-12-11 ENCOUNTER — SOCIAL WORK (OUTPATIENT)
Dept: CASE MANAGEMENT | Facility: HOSPITAL | Age: 78
End: 2023-12-11
Payer: COMMERCIAL

## 2023-12-11 DIAGNOSIS — C50.919 PRIMARY MALIGNANT NEOPLASM OF BREAST WITH METASTASIS (MULTI): ICD-10-CM

## 2023-12-11 NOTE — PROGRESS NOTES
SW was asked to investigate any barriers as patient has not come to several clinic appointments and treatment appointments. Per RN there was no answer at her home, no voicemail. SW attempted to reach patient. SW able to reach patient on the phone. SW introduced self.  Patient was aware of today's appointment. However she is residing in an assisted living [facility] and they have limitations on transportation for appointments. SW offered to assist if/when needed. SW reached out to RN. An appointment will be scheduled. SW to follow.

## 2023-12-14 ENCOUNTER — APPOINTMENT (OUTPATIENT)
Dept: HEMATOLOGY/ONCOLOGY | Facility: HOSPITAL | Age: 78
End: 2023-12-14
Payer: COMMERCIAL

## 2023-12-15 ENCOUNTER — APPOINTMENT (OUTPATIENT)
Dept: HEMATOLOGY/ONCOLOGY | Facility: HOSPITAL | Age: 78
End: 2023-12-15
Payer: COMMERCIAL

## 2023-12-29 ENCOUNTER — APPOINTMENT (OUTPATIENT)
Dept: HEMATOLOGY/ONCOLOGY | Facility: HOSPITAL | Age: 78
End: 2023-12-29
Payer: COMMERCIAL

## 2023-12-29 ENCOUNTER — TELEPHONE (OUTPATIENT)
Dept: HEMATOLOGY/ONCOLOGY | Facility: HOSPITAL | Age: 78
End: 2023-12-29
Payer: COMMERCIAL

## 2024-01-08 ENCOUNTER — SPECIALTY PHARMACY (OUTPATIENT)
Dept: HEMATOLOGY/ONCOLOGY | Facility: CLINIC | Age: 79
End: 2024-01-08
Payer: COMMERCIAL

## 2024-01-11 ENCOUNTER — SPECIALTY PHARMACY (OUTPATIENT)
Dept: PHARMACY | Facility: CLINIC | Age: 79
End: 2024-01-11

## 2024-02-10 ENCOUNTER — SPECIALTY PHARMACY (OUTPATIENT)
Dept: PHARMACY | Facility: CLINIC | Age: 79
End: 2024-02-10

## 2024-02-22 ENCOUNTER — LAB REQUISITION (OUTPATIENT)
Dept: LAB | Facility: HOSPITAL | Age: 79
End: 2024-02-22
Payer: COMMERCIAL

## 2024-02-22 DIAGNOSIS — R62.7 ADULT FAILURE TO THRIVE: ICD-10-CM

## 2024-02-22 LAB
ALBUMIN SERPL BCP-MCNC: 2.8 G/DL (ref 3.4–5)
ANION GAP SERPL CALC-SCNC: 17 MMOL/L (ref 10–20)
ANION GAP SERPL CALC-SCNC: 17 MMOL/L (ref 10–20)
BUN SERPL-MCNC: 23 MG/DL (ref 6–23)
BUN SERPL-MCNC: 23 MG/DL (ref 6–23)
CALCIUM SERPL-MCNC: 11 MG/DL (ref 8.6–10.3)
CALCIUM SERPL-MCNC: 11 MG/DL (ref 8.6–10.3)
CHLORIDE SERPL-SCNC: 98 MMOL/L (ref 98–107)
CHLORIDE SERPL-SCNC: 98 MMOL/L (ref 98–107)
CO2 SERPL-SCNC: 25 MMOL/L (ref 21–32)
CO2 SERPL-SCNC: 25 MMOL/L (ref 21–32)
CREAT SERPL-MCNC: 1.22 MG/DL (ref 0.5–1.05)
CREAT SERPL-MCNC: 1.22 MG/DL (ref 0.5–1.05)
EGFRCR SERPLBLD CKD-EPI 2021: 46 ML/MIN/1.73M*2
EGFRCR SERPLBLD CKD-EPI 2021: 46 ML/MIN/1.73M*2
GLUCOSE SERPL-MCNC: 59 MG/DL (ref 74–99)
GLUCOSE SERPL-MCNC: 59 MG/DL (ref 74–99)
PHOSPHATE SERPL-MCNC: 3.2 MG/DL (ref 2.5–4.9)
POTASSIUM SERPL-SCNC: 4 MMOL/L (ref 3.5–5.3)
POTASSIUM SERPL-SCNC: 4 MMOL/L (ref 3.5–5.3)
SODIUM SERPL-SCNC: 136 MMOL/L (ref 136–145)
SODIUM SERPL-SCNC: 136 MMOL/L (ref 136–145)

## 2024-02-22 PROCEDURE — 80048 BASIC METABOLIC PNL TOTAL CA: CPT

## 2024-02-22 PROCEDURE — 80069 RENAL FUNCTION PANEL: CPT

## 2024-04-30 ENCOUNTER — SPECIALTY PHARMACY (OUTPATIENT)
Dept: HEMATOLOGY/ONCOLOGY | Facility: CLINIC | Age: 79
End: 2024-04-30
Payer: COMMERCIAL